# Patient Record
Sex: MALE | Race: WHITE | NOT HISPANIC OR LATINO | Employment: FULL TIME | URBAN - METROPOLITAN AREA
[De-identification: names, ages, dates, MRNs, and addresses within clinical notes are randomized per-mention and may not be internally consistent; named-entity substitution may affect disease eponyms.]

---

## 2017-11-21 ENCOUNTER — NON-PROVIDER VISIT (OUTPATIENT)
Dept: OCCUPATIONAL MEDICINE | Facility: CLINIC | Age: 22
End: 2017-11-21

## 2017-11-21 DIAGNOSIS — Z02.1 DRUG TESTING, PRE-EMPLOYMENT: ICD-10-CM

## 2017-11-21 DIAGNOSIS — Z02.1 PRE-EMPLOYMENT DRUG SCREENING: ICD-10-CM

## 2017-11-21 LAB
AMP AMPHETAMINE: NORMAL
COC COCAINE: NORMAL
INT CON NEG: NORMAL
INT CON POS: NORMAL
MET METHAMPHETAMINES: NORMAL
OPI OPIATES: NORMAL
PCP PHENCYCLIDINE: NORMAL
POC DRUG COMMENT 753798-OCCUPATIONAL HEALTH: NEGATIVE
THC: NORMAL

## 2017-11-21 PROCEDURE — 80305 DRUG TEST PRSMV DIR OPT OBS: CPT | Performed by: PREVENTIVE MEDICINE

## 2018-08-07 ENCOUNTER — OFFICE VISIT (OUTPATIENT)
Dept: URGENT CARE | Facility: CLINIC | Age: 23
End: 2018-08-07
Payer: COMMERCIAL

## 2018-08-07 VITALS
TEMPERATURE: 99.1 F | RESPIRATION RATE: 14 BRPM | DIASTOLIC BLOOD PRESSURE: 70 MMHG | OXYGEN SATURATION: 98 % | BODY MASS INDEX: 22.73 KG/M2 | WEIGHT: 144.84 LBS | SYSTOLIC BLOOD PRESSURE: 110 MMHG | HEIGHT: 67 IN | HEART RATE: 60 BPM

## 2018-08-07 DIAGNOSIS — J02.9 SORE THROAT: ICD-10-CM

## 2018-08-07 DIAGNOSIS — J30.1 SEASONAL ALLERGIC RHINITIS DUE TO POLLEN: Primary | ICD-10-CM

## 2018-08-07 DIAGNOSIS — R09.82 POST-NASAL DRIP: ICD-10-CM

## 2018-08-07 LAB
INT CON NEG: NORMAL
INT CON POS: NORMAL
S PYO AG THROAT QL: NEGATIVE

## 2018-08-07 PROCEDURE — 87880 STREP A ASSAY W/OPTIC: CPT | Performed by: PHYSICIAN ASSISTANT

## 2018-08-07 PROCEDURE — 99203 OFFICE O/P NEW LOW 30 MIN: CPT | Performed by: PHYSICIAN ASSISTANT

## 2018-08-07 RX ORDER — PREDNISONE 20 MG/1
20 TABLET ORAL DAILY
Qty: 5 TAB | Refills: 0 | Status: SHIPPED | OUTPATIENT
Start: 2018-08-07 | End: 2018-08-12

## 2018-08-10 ASSESSMENT — ENCOUNTER SYMPTOMS
FEVER: 0
SORE THROAT: 1
SWOLLEN GLANDS: 0
STRIDOR: 0
SHORTNESS OF BREATH: 0
COUGH: 0
TROUBLE SWALLOWING: 0

## 2018-08-10 NOTE — PROGRESS NOTES
"Subjective:      Bernardino Mcintosh is a 23 y.o. male who presents with Pharyngitis (x2weeks, sore throat, white in back of throat, swollen)    PMH:  Reviewed with patient/family member/EPIC.   MEDS:   Current Outpatient Prescriptions:   None  Allergies   Allergen Reactions   • Pcn [Penicillins] Rash     Rash        SURGHX: History reviewed. No pertinent surgical history.  SOCHX:  reports that he has never smoked. He has never used smokeless tobacco.  FH:  Reviewed with patient/family. Not pertinent to this complaint.          Patient presents with:  Pharyngitis: x2weeks, sore throat, white in back of throat, swollen          Pharyngitis    This is a new problem. Episode onset: 2 weeks. The problem has been gradually worsening. Neither side of throat is experiencing more pain than the other. There has been no fever. The pain is at a severity of 3/10. Associated symptoms include congestion. Pertinent negatives include no coughing, plugged ear sensation, shortness of breath, stridor, swollen glands or trouble swallowing. He has had no exposure to strep or mono. He has tried nothing for the symptoms. The treatment provided no relief.       Review of Systems   Constitutional: Negative for fever.   HENT: Positive for congestion and sore throat. Negative for trouble swallowing.    Respiratory: Negative for cough, shortness of breath and stridor.    Endo/Heme/Allergies: Positive for environmental allergies.   All other systems reviewed and are negative.         Objective:     /70   Pulse 60   Temp 37.3 °C (99.1 °F)   Resp 14   Ht 1.702 m (5' 7\")   Wt 65.7 kg (144 lb 13.5 oz)   SpO2 98%   BMI 22.69 kg/m²      Physical Exam   Constitutional: He is oriented to person, place, and time. He appears well-developed and well-nourished. No distress.   HENT:   Head: Normocephalic and atraumatic.   Right Ear: Tympanic membrane normal.   Left Ear: Tympanic membrane normal.   Nose: Rhinorrhea present.   Mouth/Throat: Uvula is " midline, oropharynx is clear and moist and mucous membranes are normal. Tonsils are 1+ on the right. Tonsils are 1+ on the left. No tonsillar exudate.   Cobblestoning present on posterior oropharynx, no erythema or exudates.    Eyes: Pupils are equal, round, and reactive to light. Conjunctivae and EOM are normal.   Neck: Normal range of motion. Neck supple. No JVD present.   Cardiovascular: Normal rate, regular rhythm and normal heart sounds.    Pulmonary/Chest: Effort normal and breath sounds normal.   Abdominal: Soft.   Musculoskeletal: Normal range of motion.   Lymphadenopathy:     He has no cervical adenopathy.   Neurological: He is alert and oriented to person, place, and time.   Skin: Skin is warm and dry.         Strep:neg  Assessment/Plan:     1. Seasonal allergic rhinitis due to pollen  predniSONE (DELTASONE) 20 MG Tab    POCT Rapid Strep A   2. Post-nasal drip  predniSONE (DELTASONE) 20 MG Tab    POCT Rapid Strep A   3. Sore throat  predniSONE (DELTASONE) 20 MG Tab    POCT Rapid Strep A     PT advised saltwater gargles/swishes  3-4 times daily until symptoms improve.     PT was instructed to begin a daily OTC allergy medication for relief of allergy symptoms.  Ex: allegra, claritin, zyrtec, allerclear, etc.   Pt can also take OTC benadryl at bedtime if symptoms are keeping them awake at night.     PT should follow up with PCP in 1-2 days for re-evaluation if symptoms have not improved.  Discussed red flags and reasons to return to UC or ED.  Pt and/or family verbalized understanding of diagnosis and follow up instructions and was offered informational handout on diagnosis.  PT discharged.

## 2019-11-28 ENCOUNTER — OFFICE VISIT (OUTPATIENT)
Dept: URGENT CARE | Facility: CLINIC | Age: 24
End: 2019-11-28
Payer: COMMERCIAL

## 2019-11-28 VITALS
WEIGHT: 150.4 LBS | HEIGHT: 68 IN | HEART RATE: 92 BPM | TEMPERATURE: 99.2 F | RESPIRATION RATE: 20 BRPM | OXYGEN SATURATION: 98 % | BODY MASS INDEX: 22.79 KG/M2

## 2019-11-28 DIAGNOSIS — J06.9 VIRAL URI WITH COUGH: ICD-10-CM

## 2019-11-28 DIAGNOSIS — J02.9 PHARYNGITIS, UNSPECIFIED ETIOLOGY: ICD-10-CM

## 2019-11-28 DIAGNOSIS — S86.911A STRAIN OF RIGHT KNEE, INITIAL ENCOUNTER: ICD-10-CM

## 2019-11-28 LAB
FLUAV+FLUBV AG SPEC QL IA: NEGATIVE
INT CON NEG: NEGATIVE
INT CON NEG: NEGATIVE
INT CON POS: POSITIVE
INT CON POS: POSITIVE
S PYO AG THROAT QL: NORMAL

## 2019-11-28 PROCEDURE — 87804 INFLUENZA ASSAY W/OPTIC: CPT | Performed by: PHYSICIAN ASSISTANT

## 2019-11-28 PROCEDURE — 87880 STREP A ASSAY W/OPTIC: CPT | Performed by: PHYSICIAN ASSISTANT

## 2019-11-28 PROCEDURE — 99214 OFFICE O/P EST MOD 30 MIN: CPT | Performed by: PHYSICIAN ASSISTANT

## 2019-11-28 RX ORDER — LIDOCAINE HYDROCHLORIDE 20 MG/ML
5 SOLUTION OROPHARYNGEAL PRN
Qty: 120 ML | Refills: 0 | Status: SHIPPED | OUTPATIENT
Start: 2019-11-28 | End: 2023-04-10

## 2019-11-28 RX ORDER — PROMETHAZINE HYDROCHLORIDE AND CODEINE PHOSPHATE 6.25; 1 MG/5ML; MG/5ML
5 SYRUP ORAL EVERY 12 HOURS PRN
Qty: 70 ML | Refills: 0 | Status: SHIPPED | OUTPATIENT
Start: 2019-11-28 | End: 2019-12-05

## 2019-11-28 ASSESSMENT — ENCOUNTER SYMPTOMS
ABDOMINAL PAIN: 0
CHILLS: 1
DIARRHEA: 0
SPUTUM PRODUCTION: 0
WHEEZING: 0
SORE THROAT: 1
VOMITING: 0
SHORTNESS OF BREATH: 0
NAUSEA: 0
FEVER: 0
MYALGIAS: 1
COUGH: 1

## 2019-11-28 NOTE — LETTER
November 28, 2019       Patient: Bernardino Mcintosh   YOB: 1995   Date of Visit: 11/28/2019         To Whom It May Concern:    It is my medical opinion that Bernardino Mcintosh should be excused from work for tomorrow due to illness.        If you have any questions or concerns, please don't hesitate to call [phone]          Sincerely,          Saurabh Do P.A.-C.  Electronically Signed

## 2019-11-29 NOTE — PROGRESS NOTES
"Subjective:   Bernardino Mcintosh  is a 24 y.o. male who presents for pharyngitis & knee swelling      Patient comes clinic complaining last 3 days of upper respiratory infection.  He notes mild dry coughing.  Complains of sore throat and fullness to ears without significant pain.  Notes subjective fevers and chills.  Denies nausea vomiting abdominal pain diarrhea or rash.  Denies history of asthma bronchitis pneumonia.  Did not get flu shot.    Additionally patient comes in clinic complaining of last approximate 3 to 4 days of \"fluid on right knee\".  He states he has recently resumed running as he used to do years ago has had some achy pain.  He notes he works on his feet notes worsening achy pain and fullness to right knee after long hours on his feet.  He denies catching locking or sensations of instability.  He tried naproxen for symptoms with mild improvement.  Denies past medical history of surgery to knee.  He denies history of bracing.    Review of Systems   Constitutional: Positive for chills. Negative for fever ( subj).   HENT: Positive for congestion and sore throat. Negative for ear pain.    Respiratory: Positive for cough. Negative for sputum production, shortness of breath and wheezing.    Gastrointestinal: Negative for abdominal pain, diarrhea, nausea and vomiting.   Musculoskeletal: Positive for joint pain ( right knee) and myalgias.   Skin: Negative for rash.     Allergies   Allergen Reactions   • Pcn [Penicillins] Rash     Rash      I have worn a mask for the entire encounter with this patient.    Objective:   Pulse 92   Temp 37.3 °C (99.2 °F) (Temporal)   Resp 20   Ht 1.727 m (5' 8\")   Wt 68.2 kg (150 lb 6.4 oz)   SpO2 98%   BMI 22.87 kg/m²   Physical Exam  Vitals signs and nursing note reviewed.   Constitutional:       General: He is not in acute distress.     Appearance: He is well-developed. He is not diaphoretic.   HENT:      Head: Normocephalic and atraumatic.      Right Ear: Tympanic " membrane, ear canal and external ear normal.      Left Ear: Tympanic membrane, ear canal and external ear normal.      Nose: Nose normal.      Mouth/Throat:      Pharynx: Uvula midline. Posterior oropharyngeal erythema ( mild PND) present. No oropharyngeal exudate.      Tonsils: No tonsillar abscesses.   Eyes:      General: No scleral icterus.        Right eye: No discharge.         Left eye: No discharge.      Conjunctiva/sclera: Conjunctivae normal.   Neck:      Musculoskeletal: Neck supple.   Pulmonary:      Effort: Pulmonary effort is normal. No respiratory distress.      Breath sounds: No decreased breath sounds, wheezing, rhonchi or rales.   Musculoskeletal:      Right knee: He exhibits decreased range of motion ( Pain with terminal flexion and extension), effusion (Mild to moderate) and abnormal meniscus ( Negative Dipak's, pain with forced flexion, pain with forced extension, pain with squatting). He exhibits no ecchymosis, no deformity, no laceration, no erythema, normal alignment, no LCL laxity, normal patellar mobility and no MCL laxity. No tenderness found.      Comments: Negative Lachman's   Lymphadenopathy:      Cervical: Cervical adenopathy ( mild bilat) present.   Skin:     General: Skin is warm and dry.      Coloration: Skin is not pale.   Neurological:      Mental Status: He is alert and oriented to person, place, and time.      Coordination: Coordination normal.       Results for orders placed or performed in visit on 11/28/19   POCT Rapid Strep A   Result Value Ref Range    Rapid Strep Screen neg     Internal Control Positive Positive     Internal Control Negative Negative    POCT Influenza A/B   Result Value Ref Range    Rapid Influenza A-B negative     Internal Control Positive Positive     Internal Control Negative Negative            Assessment/Plan:   1. Viral URI with cough  - promethazine-codeine (PHENERGAN-CODEINE) 6.25-10 MG/5ML Syrup; Take 5 mL by mouth every 12 hours as needed for  up to 7 days.  Dispense: 70 mL; Refill: 0    2. Pharyngitis, unspecified etiology  - POCT Rapid Strep A  - POCT Influenza A/B  - lidocaine (XYLOCAINE) 2 % Solution; Take 5 mL by mouth as needed for Throat/Mouth Pain (q6hr PRN throat pain, ok to rinse and spit or swallow).  Dispense: 120 mL; Refill: 0    3. Strain of right knee, initial encounter  - REFERRAL TO SPORTS MEDICINE  Supportive care is reviewed with patient/caregiver - recommend to push PO fluids and electrolytes, Nsaids/tylenol, netti pot/saline irrig, humidifier in home, flonase, ponaris, antiihstamine, Cautioned regarding potential for sedation with medication.  We reviewed viral upper respiratory infection typical timeframes of improvement, over-the-counter symptom support and red flag symptoms to return to clinic    Regards to right knee patient with mild concerning symptoms of possible meniscus pathology-instructed use over-the-counter anti-inflammatories, work on wall slides, sent with additional stretches, ice particularly with hours on feet-follow-up with sports medicine with persistent problems with lack of resolution  Return to clinic with lack of resolution or progression of symptoms.      Differential diagnosis, natural history, supportive care, and indications for immediate follow-up discussed.

## 2020-01-20 ENCOUNTER — APPOINTMENT (OUTPATIENT)
Dept: URGENT CARE | Facility: PHYSICIAN GROUP | Age: 25
End: 2020-01-20
Payer: COMMERCIAL

## 2020-01-21 ENCOUNTER — OFFICE VISIT (OUTPATIENT)
Dept: URGENT CARE | Facility: PHYSICIAN GROUP | Age: 25
End: 2020-01-21
Payer: COMMERCIAL

## 2020-01-21 VITALS
OXYGEN SATURATION: 98 % | BODY MASS INDEX: 22.43 KG/M2 | WEIGHT: 148 LBS | SYSTOLIC BLOOD PRESSURE: 112 MMHG | TEMPERATURE: 98.2 F | HEIGHT: 68 IN | DIASTOLIC BLOOD PRESSURE: 56 MMHG | HEART RATE: 80 BPM | RESPIRATION RATE: 12 BRPM

## 2020-01-21 DIAGNOSIS — M25.561 ACUTE PAIN OF RIGHT KNEE: ICD-10-CM

## 2020-01-21 PROCEDURE — 99213 OFFICE O/P EST LOW 20 MIN: CPT | Performed by: FAMILY MEDICINE

## 2020-01-21 RX ORDER — IBUPROFEN 200 MG
200 TABLET ORAL EVERY 6 HOURS PRN
COMMUNITY
End: 2023-04-10

## 2020-01-21 ASSESSMENT — ENCOUNTER SYMPTOMS
FEVER: 0
WEAKNESS: 0
JOINT SWELLING: 1
NUMBNESS: 0
CHILLS: 0

## 2020-01-21 NOTE — PROGRESS NOTES
"Subjective:   Bernardino Mcintosh is a 24 y.o. male who presents for Knee Pain (right knee swelling, filling up with liquid)     Knee Pain   This is a new problem. The current episode started more than 1 month ago. The problem occurs intermittently. The problem has been waxing and waning. Associated symptoms include joint swelling. Pertinent negatives include no chills, fever, numbness or weakness.     Review of Systems   Constitutional: Negative for chills and fever.   Musculoskeletal: Positive for joint swelling.   Neurological: Negative for weakness and numbness.      Objective:   /56   Pulse 80   Temp 36.8 °C (98.2 °F)   Resp 12   Ht 1.727 m (5' 8\")   Wt 67.1 kg (148 lb)   SpO2 98%   BMI 22.50 kg/m²   Physical Exam  Constitutional:       General: He is not in acute distress.     Appearance: He is well-developed.   HENT:      Head: Normocephalic and atraumatic.   Eyes:      Conjunctiva/sclera: Conjunctivae normal.      Pupils: Pupils are equal, round, and reactive to light.   Cardiovascular:      Rate and Rhythm: Normal rate and regular rhythm.      Heart sounds: No murmur.   Pulmonary:      Effort: Pulmonary effort is normal. No respiratory distress.      Breath sounds: Normal breath sounds.   Abdominal:      General: There is no distension.      Palpations: Abdomen is soft.      Tenderness: There is no tenderness.   Skin:     General: Skin is warm and dry.   Neurological:      Mental Status: He is alert and oriented to person, place, and time.      Sensory: No sensory deficit.      Deep Tendon Reflexes: Reflexes are normal and symmetric.          Assessment/Plan:   1. Acute pain of right knee  - REFERRAL TO SPORTS MEDICINE  Use over-the-counter pain reliever, such as acetaminophen (Tylenol), ibuprofen (Advil, Motrin) or naproxen (Aleve) as needed; follow package directions for dosing.     Differential diagnosis, natural history, supportive care, and indications for immediate follow-up discussed.    "

## 2020-01-28 ENCOUNTER — OFFICE VISIT (OUTPATIENT)
Dept: MEDICAL GROUP | Facility: CLINIC | Age: 25
End: 2020-01-28
Payer: COMMERCIAL

## 2020-01-28 ENCOUNTER — APPOINTMENT (OUTPATIENT)
Dept: RADIOLOGY | Facility: IMAGING CENTER | Age: 25
End: 2020-01-28
Attending: FAMILY MEDICINE
Payer: COMMERCIAL

## 2020-01-28 VITALS
SYSTOLIC BLOOD PRESSURE: 114 MMHG | DIASTOLIC BLOOD PRESSURE: 70 MMHG | WEIGHT: 148 LBS | HEIGHT: 68 IN | BODY MASS INDEX: 22.43 KG/M2 | TEMPERATURE: 98.6 F | RESPIRATION RATE: 16 BRPM | HEART RATE: 86 BPM | OXYGEN SATURATION: 98 %

## 2020-01-28 DIAGNOSIS — M25.461 KNEE EFFUSION, RIGHT: ICD-10-CM

## 2020-01-28 DIAGNOSIS — M25.561 ACUTE PAIN OF RIGHT KNEE: ICD-10-CM

## 2020-01-28 PROCEDURE — 73564 X-RAY EXAM KNEE 4 OR MORE: CPT | Mod: TC,RT | Performed by: FAMILY MEDICINE

## 2020-01-28 PROCEDURE — 99203 OFFICE O/P NEW LOW 30 MIN: CPT | Performed by: FAMILY MEDICINE

## 2020-01-28 NOTE — PROGRESS NOTES
"CHIEF COMPLAINT:  Bernardino Mcintosh male presenting at the request of Jasbir dAames M.D.  for evaluation of knee pain.     Bernardino Mcintosh is complaining of right knee pain  Insidious onset (around mid December 2019)  Pain is at the anterior and medial knee  Quality is aching and pressure  Pain is non-radiating   Improved with resting  Aggravated by walking for extended periods  no prior problems with this area in the past   Prior Treatments: Seen at urgent care  Prior studies: NO Prior imaging has been done   Medications tried for pain include: ibuprofen (OTC) which is helping  Mechanical Symptom history: No Locking    Works as a , constantly walking (10-hour shifts)  Likes to run    REVIEW OF SYSTEMS  No Nausea, No Vomiting, No Chest Pain, No Shortness of Breath, No Dizziness, No Headache    PAST MEDICAL HISTORY:   History reviewed. No pertinent past medical history.    PMH:  has no past medical history on file.  MEDS:   Current Outpatient Medications:   •  ibuprofen (MOTRIN) 200 MG Tab, Take 200 mg by mouth every 6 hours as needed., Disp: , Rfl:   •  ibuprofen (ADVIL) 200 MG Tab, Take 200 mg by mouth every 6 hours as needed., Disp: , Rfl:   •  lidocaine (XYLOCAINE) 2 % Solution, Take 5 mL by mouth as needed for Throat/Mouth Pain (q6hr PRN throat pain, ok to rinse and spit or swallow). (Patient not taking: Reported on 1/21/2020), Disp: 120 mL, Rfl: 0  ALLERGIES:   Allergies   Allergen Reactions   • Pcn [Penicillins] Rash     Rash        SURGHX: No past surgical history on file.  SOCHX:  reports that he has never smoked. He has never used smokeless tobacco. He reports current alcohol use. He reports that he does not use drugs.  FH: Family history was reviewed, no pertinent findings to report     PHYSICAL EXAM:  /70 (BP Location: Left arm, Patient Position: Sitting, BP Cuff Size: Adult)   Pulse 86   Temp 37 °C (98.6 °F) (Temporal)   Resp 16   Ht 1.727 m (5' 8\")   Wt 67.1 kg (148 lb)   SpO2 98%  "  BMI 22.50 kg/m²      well-developed, well-nourished in no apparent distress, alert and oriented x 3.  Gait: normal     RIGHT Knee:  Slight Varus and Swelling   Range of Motion Markedly limited with Flexion  3+ effusion  Patellar Medial facet tenderness and Apprehension  Medial Joint Line Tenderness and POSITIVE Dipak  Lateral Joint Line Non-tender and NEGATIVE Dipak  Trace Laxity with Varus stress  Trace Laxity with Valgus stress  Lachman's testing is Trace  Posterior Drawer Testing is Trace  The leg is otherwise neurovascularly intact    LEFT Knee:  Slight Varus and No Swelling   Range of Motion Intact  Trace effusion  Patellar No tenderness and no apprehension  Medial Joint Line Non-tender and NEGATIVE Dipak  Lateral Joint Line Non-tender and NEGATIVE Dipak  Trace Laxity with Varus stress  Trace Laxity with Valgus stress  Lachman's testing is Trace  Posterior Drawer Testing is Trace  The leg is otherwise neurovascularly intact    Additional Findings: Tight hamstrings    1. Acute pain of right knee  DX-KNEE COMPLETE 4+ RIGHT    MR-KNEE-W/O RIGHT   2. Knee effusion, right  MR-KNEE-W/O RIGHT     Insidious onset (around mid December 2019)  Pain is at the anterior and medial knee  Patient is a runner, symptoms worse with running, particularly hills    Examination is consistent with possible cartilage defect and he does have POSITIVE Dipak's testing and POSITIVE joint line tenderness medially indicating likely meniscal tear which would explain his knee effusion  Unable to run secondary to continued pain/swelling    X-rays in the office were NORMAL    Check MRI of the RIGHT knee for assessment of cartilage and particularly the RIGHT medial meniscus    Return in about 2 weeks (around 2/11/2020).  After MRI to discuss results and further management options    1/28/2020 9:34 AM     HISTORY/REASON FOR EXAM:  Pain/Deformity Following Trauma  Pain on the R knee x 6 weeks     TECHNIQUE/EXAM DESCRIPTION AND  NUMBER OF VIEWS:  4 views of the RIGHT knee.     COMPARISON: None     FINDINGS:  No acute fracture or dislocation.     No joint osteoarthritis     No knee joint effusion.     IMPRESSION:        1. No acute osseous abnormality.        taken here and reviewed by me    Thank you Jasbir Adames M.D. for allowing me to participate in caring for your patient.

## 2020-02-06 ENCOUNTER — HOSPITAL ENCOUNTER (OUTPATIENT)
Dept: RADIOLOGY | Facility: MEDICAL CENTER | Age: 25
End: 2020-02-06
Attending: FAMILY MEDICINE
Payer: COMMERCIAL

## 2020-02-06 DIAGNOSIS — M25.461 KNEE EFFUSION, RIGHT: ICD-10-CM

## 2020-02-06 DIAGNOSIS — M25.561 ACUTE PAIN OF RIGHT KNEE: ICD-10-CM

## 2020-02-06 PROCEDURE — 73721 MRI JNT OF LWR EXTRE W/O DYE: CPT | Mod: RT

## 2020-02-07 ENCOUNTER — OFFICE VISIT (OUTPATIENT)
Dept: MEDICAL GROUP | Facility: CLINIC | Age: 25
End: 2020-02-07
Payer: COMMERCIAL

## 2020-02-07 VITALS — WEIGHT: 148 LBS | BODY MASS INDEX: 22.43 KG/M2 | HEIGHT: 68 IN

## 2020-02-07 DIAGNOSIS — M25.461 KNEE EFFUSION, RIGHT: ICD-10-CM

## 2020-02-07 DIAGNOSIS — M67.51 PLICA SYNDROME OF RIGHT KNEE: ICD-10-CM

## 2020-02-07 PROCEDURE — 99213 OFFICE O/P EST LOW 20 MIN: CPT | Performed by: FAMILY MEDICINE

## 2020-02-07 NOTE — PROGRESS NOTES
Patient is here to discuss MRI results  Knee swelling persists  Pain persists and it is typically activity dependent  Pain is worse at the end of his work week when he has been up and walking around a lot a week    1. Plica syndrome of right knee     2. Knee effusion, right       Patient symptoms are likely related to knee plica  Knee effusion and swelling  Schedule [facility] ultrasound ASPIRATION with corticosteroid injection (5 and 1)  We will see him back for the procedure            2/6/2020 9:06 AM     HISTORY/REASON FOR EXAM:  Knee pain, effusion or neg xray.  Acute pain right knee, runner     TECHNIQUE/EXAM DESCRIPTION:  MRI of the RIGHT knee without contrast.     The study was performed on a Noah 1.5 Arielle MRI scanner.  T1 coronal, proton density fat-suppressed coronal, fast spin-echo T2 fat-suppressed axial, intermediate fast spin-echo sagittal, and intermediate fast spin-echo fat-suppressed thin-section   sagittal images were obtained.     COMPARISON: January 20 radiographs     FINDINGS:  There is a large joint effusion. Hoffa's fat appears tethered to the intercondylar notch. There is no fibrosis or hemosiderin to suggest hemorrhage     There is a thickened suprapatellar plica. No thickened medial plica     There is a small Baker's cyst. There is adjacent edema indicating extravasation     Menisci:  Normal in signal and morphology.     Tendons and Ligaments:  The cruciate and collateral ligaments are intact. No acute flexor or extensor mechanism abnormality is seen.     Osseous Structures/Cartilage:  No fracture, focal cartilage defect or marrow edema. Normal positioning of the patella.     IMPRESSION:     Intact ligaments and menisci     Large knee joint effusion, small Baker's cyst which extravasates     Thickened suprapatellar plica without adjacent edema or medial plica to suggest impingement     Hoffa's fat extends to the intercondylar notch which could indicate tethering but there is no evidence of  fibrosis or hemorrhage as is seen with chronic Hoffa's disease        Interpreted in the office today with the patient    Thank you aJsbir Adames M.D. for allowing me to participate in caring for your patient.

## 2020-02-11 ENCOUNTER — OFFICE VISIT (OUTPATIENT)
Dept: MEDICAL GROUP | Facility: CLINIC | Age: 25
End: 2020-02-11
Payer: COMMERCIAL

## 2020-02-11 VITALS — BODY MASS INDEX: 22.43 KG/M2 | HEIGHT: 68 IN | WEIGHT: 148 LBS

## 2020-02-11 DIAGNOSIS — M25.461 KNEE EFFUSION, RIGHT: ICD-10-CM

## 2020-02-11 DIAGNOSIS — M67.51 PLICA SYNDROME OF RIGHT KNEE: ICD-10-CM

## 2020-02-11 PROCEDURE — 20611 DRAIN/INJ JOINT/BURSA W/US: CPT | Performed by: FAMILY MEDICINE

## 2020-02-11 RX ORDER — TRIAMCINOLONE ACETONIDE 40 MG/ML
40 INJECTION, SUSPENSION INTRA-ARTICULAR; INTRAMUSCULAR ONCE
Status: COMPLETED | OUTPATIENT
Start: 2020-02-11 | End: 2020-02-11

## 2020-02-11 RX ADMIN — TRIAMCINOLONE ACETONIDE 40 MG: 40 INJECTION, SUSPENSION INTRA-ARTICULAR; INTRAMUSCULAR at 15:39

## 2020-02-11 NOTE — PROGRESS NOTES
1. Plica syndrome of right knee  triamcinolone acetonide (KENALOG-40) injection 40 mg   2. Knee effusion, right  triamcinolone acetonide (KENALOG-40) injection 40 mg       PROCEDURE NOTE:  right knee ASPIRATION/WITH CORTICOSTEROID  injection under ultrasound guidance with direct needle visualization  Consent was obtained, using sterile technique the knee was prepped  Supra-lateral patellar approach.   18 G needle for aspiration of 65 cc of straw-colored colored fluid and the needle withdrawn.    Using the same port 5 cc marcaine and 40 mg kenalog injected into the knee joint  The procedure was well tolerated.    Watch for fever, or increased swelling or persistent pain in knee. Call or return to clinic prn if such symptoms occur or the knee fails to improve as anticipated.

## 2023-04-10 ENCOUNTER — OFFICE VISIT (OUTPATIENT)
Dept: URGENT CARE | Facility: CLINIC | Age: 28
End: 2023-04-10
Payer: COMMERCIAL

## 2023-04-10 VITALS
HEIGHT: 67 IN | BODY MASS INDEX: 23.54 KG/M2 | SYSTOLIC BLOOD PRESSURE: 120 MMHG | DIASTOLIC BLOOD PRESSURE: 80 MMHG | WEIGHT: 150 LBS | HEART RATE: 82 BPM | RESPIRATION RATE: 16 BRPM | OXYGEN SATURATION: 98 % | TEMPERATURE: 98.7 F

## 2023-04-10 DIAGNOSIS — J02.9 VIRAL PHARYNGITIS: ICD-10-CM

## 2023-04-10 DIAGNOSIS — J02.9 SORE THROAT: ICD-10-CM

## 2023-04-10 LAB
FLUAV RNA SPEC QL NAA+PROBE: NEGATIVE
FLUBV RNA SPEC QL NAA+PROBE: NEGATIVE
RSV RNA SPEC QL NAA+PROBE: NEGATIVE
S PYO DNA SPEC NAA+PROBE: NOT DETECTED
SARS-COV-2 RNA RESP QL NAA+PROBE: NEGATIVE

## 2023-04-10 PROCEDURE — 99214 OFFICE O/P EST MOD 30 MIN: CPT | Performed by: NURSE PRACTITIONER

## 2023-04-10 PROCEDURE — 87651 STREP A DNA AMP PROBE: CPT | Performed by: NURSE PRACTITIONER

## 2023-04-10 PROCEDURE — 0241U POCT CEPHEID COV-2, FLU A/B, RSV - PCR: CPT | Performed by: NURSE PRACTITIONER

## 2023-04-10 NOTE — PROGRESS NOTES
"Bernardino Mcintosh is a 27 y.o. male who presents for Pharyngitis (X 2-3 days, sore throat, headaches, vomited once last night)      HPI  This is a new problem. Bernardino Mcintosh is a 27 y.o. patient who presents to urgent care with c/o: Sore throat for 3 days, headache.  He vomited once last night.  He has felt chilled but has not run a fever.  He denies body aches, ear pain, nausea, vomiting, diarrhea, cough.  He travels for work so unknown exposure to any persons with strep or COVID.  Treatments tried: Over-the-counter medicine, increase fluids  No other aggravating or alleviating factors.       ROS See HPI    Allergies:       Allergies   Allergen Reactions    Pcn [Penicillins] Rash     Rash          PMSFS Hx:  History reviewed. No pertinent past medical history.  History reviewed. No pertinent surgical history.  History reviewed. No pertinent family history.  Social History     Tobacco Use    Smoking status: Never    Smokeless tobacco: Never   Substance Use Topics    Alcohol use: Yes     Comment: occ       Problems:   There is no problem list on file for this patient.      Medications:   No current outpatient medications on file prior to visit.     No current facility-administered medications on file prior to visit.          Objective:     /80 (BP Location: Left arm, Patient Position: Sitting, BP Cuff Size: Adult)   Pulse 82   Temp 37.1 °C (98.7 °F) (Temporal)   Resp 16   Ht 1.702 m (5' 7\")   Wt 68 kg (150 lb)   SpO2 98%   BMI 23.49 kg/m²     Physical Exam  Vitals and nursing note reviewed.   Constitutional:       General: He is not in acute distress.     Appearance: He is well-developed. He is not ill-appearing.   HENT:      Right Ear: Tympanic membrane, ear canal and external ear normal.      Left Ear: Tympanic membrane, ear canal and external ear normal.      Mouth/Throat:      Mouth: Mucous membranes are moist.      Pharynx: Posterior oropharyngeal erythema (Mild) present. No oropharyngeal exudate. "   Cardiovascular:      Rate and Rhythm: Normal rate and regular rhythm.      Pulses: Normal pulses.      Heart sounds: Normal heart sounds.   Pulmonary:      Effort: Pulmonary effort is normal.      Breath sounds: Normal breath sounds.   Musculoskeletal:      Cervical back: Normal range of motion and neck supple.   Lymphadenopathy:      Head:      Right side of head: No tonsillar adenopathy.      Left side of head: No tonsillar adenopathy.      Cervical: No cervical adenopathy.      Upper Body:      Right upper body: No supraclavicular adenopathy.      Left upper body: No supraclavicular adenopathy.   Skin:     General: Skin is warm and dry.      Capillary Refill: Capillary refill takes less than 2 seconds.   Neurological:      Mental Status: He is alert and oriented to person, place, and time.   Psychiatric:         Mood and Affect: Mood normal.         Speech: Speech normal.         Behavior: Behavior normal. Behavior is cooperative.         Thought Content: Thought content normal.         Assessment /Associated Orders:      1. Viral pharyngitis        2. Sore throat  POCT GROUP A STREP, PCR    POCT CoV-2, Flu A/B, RSV by PCR            Medical Decision Making:    Pt is clinically stable at today's acute urgent care visit.  No acute distress noted. Appropriate for outpatient care at this time.   Acute problem today with uncertain prognosis.   Salt water gargles BID and prn. Suggested 1/4 to 1/2 teaspoon (1.5 to 3.0 g) of salt per one cup (8 ounces or 250 mL) of warm water.   OTC throat analgesic spray or lozenge of choice prn throat pain. Dosage and directions per   Keep well hydrated  Discussed that this illness appears to be viral in nature. I did not see any evidence of a bacterial process.   OTC medications can be used for symptom relief. Follow manufacturers guidelines for dosing and instructions.  These OTC medications will not make you better any faster but can help reduce your discomfort.    Discussed Dx, management options (risks,benefits, and alternatives to planned treatment), natural progression and supportive care.  Expressed understanding and the treatment plan was agreed upon.   Questions were encouraged and answered   Return to PCP/ urgent care prn if new or worsening sx or if there is no improvement in condition prn.      Time I spent evaluating Bernardino Mcintosh in urgent care today was 31  minutes. This time includes preparing for visit, reviewing any pertinent notes or test results, counseling/education, exam, obtaining HPI, interpretation of lab tests, medication management and documentation as indicated above.Time does not include separately billable procedures noted .       Please note that this dictation was created using voice recognition software. I have worked with consultants from the vendor as well as technical experts from [facility] to optimize the interface. I have made every reasonable attempt to correct obvious errors, but I expect that there are errors of grammar and possibly content that I did not discover before finalizing the note.  This note was electronically signed by provider

## 2023-04-10 NOTE — LETTER
April 10, 2023       Patient: Bernardino Mcintosh   YOB: 1995   Date of Visit: 4/10/2023         To Whom It May Concern:    In my medical opinion, I recommend that Bernardino Mcintosh return to full duty, no restrictions.              Sincerely,          ESAU Ortiz  Electronically Signed